# Patient Record
Sex: MALE | Race: WHITE | NOT HISPANIC OR LATINO | URBAN - METROPOLITAN AREA
[De-identification: names, ages, dates, MRNs, and addresses within clinical notes are randomized per-mention and may not be internally consistent; named-entity substitution may affect disease eponyms.]

---

## 2017-01-05 ENCOUNTER — EMERGENCY (EMERGENCY)
Facility: HOSPITAL | Age: 62
LOS: 1 days | Discharge: HOME | End: 2017-01-05
Admitting: INTERNAL MEDICINE
Payer: COMMERCIAL

## 2017-01-05 DIAGNOSIS — Z90.49 ACQUIRED ABSENCE OF OTHER SPECIFIED PARTS OF DIGESTIVE TRACT: Chronic | ICD-10-CM

## 2017-01-05 DIAGNOSIS — M79.641 PAIN IN RIGHT HAND: ICD-10-CM

## 2017-01-05 DIAGNOSIS — Z87.39 PERSONAL HISTORY OF OTHER DISEASES OF THE MUSCULOSKELETAL SYSTEM AND CONNECTIVE TISSUE: Chronic | ICD-10-CM

## 2017-01-05 PROCEDURE — 85027 COMPLETE CBC AUTOMATED: CPT

## 2017-01-05 PROCEDURE — 80048 BASIC METABOLIC PNL TOTAL CA: CPT

## 2017-01-05 PROCEDURE — 99284 EMERGENCY DEPT VISIT MOD MDM: CPT | Mod: 25

## 2017-01-05 PROCEDURE — 99284 EMERGENCY DEPT VISIT MOD MDM: CPT

## 2017-01-05 PROCEDURE — 85379 FIBRIN DEGRADATION QUANT: CPT

## 2017-01-05 PROCEDURE — 96374 THER/PROPH/DIAG INJ IV PUSH: CPT

## 2017-01-25 ENCOUNTER — APPOINTMENT (OUTPATIENT)
Dept: ORTHOPEDIC SURGERY | Facility: CLINIC | Age: 62
End: 2017-01-25

## 2017-01-25 VITALS
BODY MASS INDEX: 28 KG/M2 | SYSTOLIC BLOOD PRESSURE: 165 MMHG | HEART RATE: 84 BPM | WEIGHT: 200 LBS | HEIGHT: 71 IN | DIASTOLIC BLOOD PRESSURE: 93 MMHG

## 2017-01-25 DIAGNOSIS — Z78.9 OTHER SPECIFIED HEALTH STATUS: ICD-10-CM

## 2017-01-25 DIAGNOSIS — G56.21 LESION OF ULNAR NERVE, RIGHT UPPER LIMB: ICD-10-CM

## 2017-01-25 DIAGNOSIS — Z86.79 PERSONAL HISTORY OF OTHER DISEASES OF THE CIRCULATORY SYSTEM: ICD-10-CM

## 2017-01-25 RX ORDER — AMLODIPINE BESYLATE AND VALSARTAN 10; 320 MG/1; MG/1
TABLET, FILM COATED ORAL
Refills: 0 | Status: ACTIVE | COMMUNITY

## 2017-02-01 ENCOUNTER — APPOINTMENT (OUTPATIENT)
Dept: ORTHOPEDIC SURGERY | Facility: CLINIC | Age: 62
End: 2017-02-01

## 2017-02-01 VITALS
HEIGHT: 71 IN | WEIGHT: 201 LBS | DIASTOLIC BLOOD PRESSURE: 65 MMHG | SYSTOLIC BLOOD PRESSURE: 105 MMHG | BODY MASS INDEX: 28.14 KG/M2 | HEART RATE: 51 BPM

## 2017-02-07 ENCOUNTER — CHART COPY (OUTPATIENT)
Age: 62
End: 2017-02-07

## 2017-03-22 ENCOUNTER — APPOINTMENT (OUTPATIENT)
Dept: ORTHOPEDIC SURGERY | Facility: CLINIC | Age: 62
End: 2017-03-22

## 2017-04-09 ENCOUNTER — OUTPATIENT (OUTPATIENT)
Dept: OUTPATIENT SERVICES | Facility: HOSPITAL | Age: 62
LOS: 1 days | End: 2017-04-09
Payer: COMMERCIAL

## 2017-04-09 ENCOUNTER — APPOINTMENT (OUTPATIENT)
Dept: MRI IMAGING | Facility: CLINIC | Age: 62
End: 2017-04-09
Payer: SELF-PAY

## 2017-04-09 DIAGNOSIS — M70.31 OTHER BURSITIS OF ELBOW, RIGHT ELBOW: ICD-10-CM

## 2017-04-09 DIAGNOSIS — Z90.49 ACQUIRED ABSENCE OF OTHER SPECIFIED PARTS OF DIGESTIVE TRACT: Chronic | ICD-10-CM

## 2017-04-09 DIAGNOSIS — Z87.39 PERSONAL HISTORY OF OTHER DISEASES OF THE MUSCULOSKELETAL SYSTEM AND CONNECTIVE TISSUE: Chronic | ICD-10-CM

## 2017-04-09 PROCEDURE — 73221 MRI JOINT UPR EXTREM W/O DYE: CPT | Mod: 26,RT

## 2017-04-09 PROCEDURE — 73221 MRI JOINT UPR EXTREM W/O DYE: CPT

## 2017-04-19 ENCOUNTER — CHART COPY (OUTPATIENT)
Age: 62
End: 2017-04-19

## 2017-05-12 ENCOUNTER — APPOINTMENT (OUTPATIENT)
Dept: ORTHOPEDIC SURGERY | Facility: CLINIC | Age: 62
End: 2017-05-12

## 2017-05-12 VITALS — SYSTOLIC BLOOD PRESSURE: 152 MMHG | DIASTOLIC BLOOD PRESSURE: 71 MMHG | HEART RATE: 56 BPM

## 2017-05-12 VITALS — HEIGHT: 72 IN | BODY MASS INDEX: 27.09 KG/M2 | WEIGHT: 200 LBS

## 2017-05-31 ENCOUNTER — APPOINTMENT (OUTPATIENT)
Dept: ORTHOPEDIC SURGERY | Facility: CLINIC | Age: 62
End: 2017-05-31

## 2017-05-31 VITALS — BODY MASS INDEX: 27.09 KG/M2 | HEIGHT: 72 IN | WEIGHT: 200 LBS

## 2017-05-31 DIAGNOSIS — M70.31 OTHER BURSITIS OF ELBOW, RIGHT ELBOW: ICD-10-CM

## 2017-06-19 ENCOUNTER — RX RENEWAL (OUTPATIENT)
Age: 62
End: 2017-06-19

## 2017-07-18 ENCOUNTER — OUTPATIENT (OUTPATIENT)
Dept: OUTPATIENT SERVICES | Facility: HOSPITAL | Age: 62
LOS: 1 days | End: 2017-07-18
Payer: COMMERCIAL

## 2017-07-18 ENCOUNTER — APPOINTMENT (OUTPATIENT)
Dept: MRI IMAGING | Facility: CLINIC | Age: 62
End: 2017-07-18

## 2017-07-18 DIAGNOSIS — Z87.39 PERSONAL HISTORY OF OTHER DISEASES OF THE MUSCULOSKELETAL SYSTEM AND CONNECTIVE TISSUE: Chronic | ICD-10-CM

## 2017-07-18 DIAGNOSIS — Z00.8 ENCOUNTER FOR OTHER GENERAL EXAMINATION: ICD-10-CM

## 2017-07-18 DIAGNOSIS — Z90.49 ACQUIRED ABSENCE OF OTHER SPECIFIED PARTS OF DIGESTIVE TRACT: Chronic | ICD-10-CM

## 2017-07-18 PROCEDURE — 72141 MRI NECK SPINE W/O DYE: CPT

## 2017-11-24 ENCOUNTER — APPOINTMENT (OUTPATIENT)
Dept: MRI IMAGING | Facility: CLINIC | Age: 62
End: 2017-11-24

## 2017-11-27 ENCOUNTER — APPOINTMENT (OUTPATIENT)
Dept: MRI IMAGING | Facility: CLINIC | Age: 62
End: 2017-11-27
Payer: COMMERCIAL

## 2017-11-27 ENCOUNTER — OUTPATIENT (OUTPATIENT)
Dept: OUTPATIENT SERVICES | Facility: HOSPITAL | Age: 62
LOS: 1 days | End: 2017-11-27
Payer: COMMERCIAL

## 2017-11-27 DIAGNOSIS — M66.321 SPONTANEOUS RUPTURE OF FLEXOR TENDONS, RIGHT UPPER ARM: ICD-10-CM

## 2017-11-27 DIAGNOSIS — Z90.49 ACQUIRED ABSENCE OF OTHER SPECIFIED PARTS OF DIGESTIVE TRACT: Chronic | ICD-10-CM

## 2017-11-27 DIAGNOSIS — Z87.39 PERSONAL HISTORY OF OTHER DISEASES OF THE MUSCULOSKELETAL SYSTEM AND CONNECTIVE TISSUE: Chronic | ICD-10-CM

## 2017-11-27 PROCEDURE — 73221 MRI JOINT UPR EXTREM W/O DYE: CPT

## 2017-11-27 PROCEDURE — 73221 MRI JOINT UPR EXTREM W/O DYE: CPT | Mod: 26,RT

## 2017-12-28 ENCOUNTER — OUTPATIENT (OUTPATIENT)
Dept: OUTPATIENT SERVICES | Facility: HOSPITAL | Age: 62
LOS: 1 days | End: 2017-12-28
Payer: COMMERCIAL

## 2017-12-28 ENCOUNTER — APPOINTMENT (OUTPATIENT)
Dept: MRI IMAGING | Facility: CLINIC | Age: 62
End: 2017-12-28

## 2017-12-28 DIAGNOSIS — Z00.8 ENCOUNTER FOR OTHER GENERAL EXAMINATION: ICD-10-CM

## 2017-12-28 DIAGNOSIS — Z87.39 PERSONAL HISTORY OF OTHER DISEASES OF THE MUSCULOSKELETAL SYSTEM AND CONNECTIVE TISSUE: Chronic | ICD-10-CM

## 2017-12-28 DIAGNOSIS — Z90.49 ACQUIRED ABSENCE OF OTHER SPECIFIED PARTS OF DIGESTIVE TRACT: Chronic | ICD-10-CM

## 2017-12-28 PROCEDURE — 73721 MRI JNT OF LWR EXTRE W/O DYE: CPT

## 2017-12-28 PROCEDURE — 73721 MRI JNT OF LWR EXTRE W/O DYE: CPT | Mod: 26,RT

## 2018-05-02 ENCOUNTER — OUTPATIENT (OUTPATIENT)
Dept: OUTPATIENT SERVICES | Facility: HOSPITAL | Age: 63
LOS: 1 days | End: 2018-05-02
Payer: COMMERCIAL

## 2018-05-02 VITALS
RESPIRATION RATE: 16 BRPM | TEMPERATURE: 98 F | HEART RATE: 55 BPM | WEIGHT: 162.04 LBS | HEIGHT: 71 IN | DIASTOLIC BLOOD PRESSURE: 80 MMHG | SYSTOLIC BLOOD PRESSURE: 130 MMHG

## 2018-05-02 DIAGNOSIS — Z87.39 PERSONAL HISTORY OF OTHER DISEASES OF THE MUSCULOSKELETAL SYSTEM AND CONNECTIVE TISSUE: Chronic | ICD-10-CM

## 2018-05-02 DIAGNOSIS — G56.00 CARPAL TUNNEL SYNDROME, UNSPECIFIED UPPER LIMB: ICD-10-CM

## 2018-05-02 DIAGNOSIS — G56.01 CARPAL TUNNEL SYNDROME, RIGHT UPPER LIMB: ICD-10-CM

## 2018-05-02 DIAGNOSIS — Z90.49 ACQUIRED ABSENCE OF OTHER SPECIFIED PARTS OF DIGESTIVE TRACT: Chronic | ICD-10-CM

## 2018-05-02 DIAGNOSIS — Z98.890 OTHER SPECIFIED POSTPROCEDURAL STATES: Chronic | ICD-10-CM

## 2018-05-02 DIAGNOSIS — I10 ESSENTIAL (PRIMARY) HYPERTENSION: ICD-10-CM

## 2018-05-02 LAB
BUN SERPL-MCNC: 15 MG/DL — SIGNIFICANT CHANGE UP (ref 7–23)
CALCIUM SERPL-MCNC: 9.3 MG/DL — SIGNIFICANT CHANGE UP (ref 8.4–10.5)
CHLORIDE SERPL-SCNC: 104 MMOL/L — SIGNIFICANT CHANGE UP (ref 98–107)
CO2 SERPL-SCNC: 27 MMOL/L — SIGNIFICANT CHANGE UP (ref 22–31)
CREAT SERPL-MCNC: 1.16 MG/DL — SIGNIFICANT CHANGE UP (ref 0.5–1.3)
GLUCOSE SERPL-MCNC: 81 MG/DL — SIGNIFICANT CHANGE UP (ref 70–99)
HCT VFR BLD CALC: 38.9 % — LOW (ref 39–50)
HGB BLD-MCNC: 12.3 G/DL — LOW (ref 13–17)
MCHC RBC-ENTMCNC: 28.9 PG — SIGNIFICANT CHANGE UP (ref 27–34)
MCHC RBC-ENTMCNC: 31.6 % — LOW (ref 32–36)
MCV RBC AUTO: 91.3 FL — SIGNIFICANT CHANGE UP (ref 80–100)
NRBC # FLD: 0 — SIGNIFICANT CHANGE UP
PLATELET # BLD AUTO: 260 K/UL — SIGNIFICANT CHANGE UP (ref 150–400)
PMV BLD: 10.7 FL — SIGNIFICANT CHANGE UP (ref 7–13)
POTASSIUM SERPL-MCNC: 4.5 MMOL/L — SIGNIFICANT CHANGE UP (ref 3.5–5.3)
POTASSIUM SERPL-SCNC: 4.5 MMOL/L — SIGNIFICANT CHANGE UP (ref 3.5–5.3)
RBC # BLD: 4.26 M/UL — SIGNIFICANT CHANGE UP (ref 4.2–5.8)
RBC # FLD: 12.7 % — SIGNIFICANT CHANGE UP (ref 10.3–14.5)
SODIUM SERPL-SCNC: 141 MMOL/L — SIGNIFICANT CHANGE UP (ref 135–145)
WBC # BLD: 4.24 K/UL — SIGNIFICANT CHANGE UP (ref 3.8–10.5)
WBC # FLD AUTO: 4.24 K/UL — SIGNIFICANT CHANGE UP (ref 3.8–10.5)

## 2018-05-02 PROCEDURE — 93010 ELECTROCARDIOGRAM REPORT: CPT

## 2018-05-02 NOTE — H&P PST ADULT - PSH
Ganglion  EXCISION - 1971  H/O arthroscopy of right knee  4/18  H/O: rotator cuff tear  6/2015  S/P Appendectomy  1986  S/P cholecystectomy  2013  S/P Discectomy  LUMBAR - 2009

## 2018-05-02 NOTE — H&P PST ADULT - PROBLEM SELECTOR PLAN 1
Pt given pre-op instructions and chlorhexidine and to take own GI protection   Pt to see PCP for MC today - forms given   OR Booking notified of JETT precautions , MS  Call placed on  mg by Ortho for recent Arthroscopy and call to Surgeon office and LM of same for confirmation.

## 2018-05-02 NOTE — H&P PST ADULT - NEUROLOGICAL DETAILS
sensation intact/strength decreased/alert and oriented x 3/responds to pain/responds to verbal commands

## 2018-05-02 NOTE — H&P PST ADULT - MALLAMPATI CLASS
Class IV (difficult) - the soft palate is not visible at all/uvula not visualized with phonation Class III - visualization of the soft palate and the base of the uvula/with phonation

## 2018-05-02 NOTE — H&P PST ADULT - NSANTHOSAYNRD_GEN_A_CORE
No. JETT screening performed.  STOP BANG Legend: 0-2 = LOW Risk; 3-4 = INTERMEDIATE Risk; 5-8 = HIGH Risk

## 2018-05-02 NOTE — H&P PST ADULT - HISTORY OF PRESENT ILLNESS
Pt is a 61 yr old male had  right shoulder arthroscopy rotator cuff repair 2015. with improvement in pain.  Per pet,  pain returned soon after while in PT and he was dx with another tear to (R) rotator cuff.  Scheduled for Right Shoulder Arthroscopy Revision Rotator Cuff Repair possible Biceps Tenodesis  9/6/16. Pt is a 62 yr old male scheduled for Right Endoscopic Carpal Tunnel Release with dr Martinez 5/7/18. Pt c/o of pain and numbness right hand and fingers for past 5 years - now to have repair. Pt hx of MS diagnosed 18 years ago - not on meds at this time and c/o of mild left sided weakness but denies need for support with ambulation. Pt hx of right shoulder rotator cuff surgery 2016, and right knee Arthroscopy 4/19/18.

## 2018-05-02 NOTE — H&P PST ADULT - NEGATIVE ENMT SYMPTOMS
no vertigo/no sinus symptoms/no tinnitus/no nasal congestion/no throat pain/no dysphagia/no hearing difficulty/no ear pain

## 2018-05-02 NOTE — H&P PST ADULT - PMH
Carpal tunnel syndrome  right  GERD (Gastroesophageal Reflux Disease)    Hypertension    Meniscus degeneration  Right knee - 4/13/18  Multiple Sclerosis    Rotator cuff disorder, right

## 2018-05-02 NOTE — H&P PST ADULT - SENSORY
numbness and tingling in right hand fingers r/t carpal tunnel  slight left sided weakness - pt denies need for support with ambulation

## 2018-05-02 NOTE — H&P PST ADULT - NEUROLOGICAL COMMENTS
Hx of MS diagnosed 18 yrs ago - left sided weakness with leg stronger than arm - pt denies need for support with ambulation - seen by Neuro and has been part of recent trial - no meds at present Dx of MS 18 yrs ago - pt not on med at this time - recent participant in study trial Hx of MS diagnosed 18 yrs ago - mild left sided weakness with leg stronger than arm - pt denies need for support with ambulation - seen by Neuro and has been part of recent trial - no meds at present

## 2018-05-02 NOTE — H&P PST ADULT - MUSCULOSKELETAL COMMENTS
Pt c/o of right wrist numbness and tingling for past 5 years that he now has decided to have repaired. - pt had right knee arthroscopy 4/19/18 and hx of right shoulder rotator cuff repair 9/16 with chronic pain 3-5/10 Pt c/o of right shoulder pain with certain movements Pt c/o of right wrist numbness and tingling for past 5 years that he now has decided to have repaired. - pt had right knee arthroscopy 4/19/18 and on  mg po OD as per surgeon instructions postop -  and hx of right shoulder rotator cuff repair 9/16 with chronic pain 3-5/10

## 2018-05-06 ENCOUNTER — TRANSCRIPTION ENCOUNTER (OUTPATIENT)
Age: 63
End: 2018-05-06

## 2018-05-07 ENCOUNTER — OUTPATIENT (OUTPATIENT)
Dept: OUTPATIENT SERVICES | Facility: HOSPITAL | Age: 63
LOS: 1 days | Discharge: HOME | End: 2018-05-07

## 2018-05-07 VITALS
OXYGEN SATURATION: 100 % | HEIGHT: 71 IN | TEMPERATURE: 98 F | HEART RATE: 58 BPM | DIASTOLIC BLOOD PRESSURE: 77 MMHG | SYSTOLIC BLOOD PRESSURE: 138 MMHG | RESPIRATION RATE: 16 BRPM | WEIGHT: 207.23 LBS

## 2018-05-07 VITALS
DIASTOLIC BLOOD PRESSURE: 71 MMHG | TEMPERATURE: 98 F | RESPIRATION RATE: 16 BRPM | OXYGEN SATURATION: 100 % | SYSTOLIC BLOOD PRESSURE: 118 MMHG | HEART RATE: 62 BPM

## 2018-05-07 DIAGNOSIS — Z90.49 ACQUIRED ABSENCE OF OTHER SPECIFIED PARTS OF DIGESTIVE TRACT: Chronic | ICD-10-CM

## 2018-05-07 DIAGNOSIS — Z98.890 OTHER SPECIFIED POSTPROCEDURAL STATES: Chronic | ICD-10-CM

## 2018-05-07 DIAGNOSIS — Z87.39 PERSONAL HISTORY OF OTHER DISEASES OF THE MUSCULOSKELETAL SYSTEM AND CONNECTIVE TISSUE: Chronic | ICD-10-CM

## 2018-05-07 DIAGNOSIS — G56.01 CARPAL TUNNEL SYNDROME, RIGHT UPPER LIMB: ICD-10-CM

## 2018-05-07 RX ORDER — ONDANSETRON 8 MG/1
1 TABLET, FILM COATED ORAL
Qty: 6 | Refills: 0 | OUTPATIENT
Start: 2018-05-07

## 2018-05-07 NOTE — ASU PREOP CHECKLIST - SITE MARKED BY SURGEON
Writer gave RN to RN report to [place]. Plan to discharge at [id number] via [employer] transportation. RN to call [phone] with any questions.   right yes/right

## 2018-07-17 PROBLEM — M23.309 OTHER MENISCUS DERANGEMENTS, UNSPECIFIED MENISCUS, UNSPECIFIED KNEE: Chronic | Status: ACTIVE | Noted: 2018-05-02

## 2018-07-18 PROBLEM — G56.00 CARPAL TUNNEL SYNDROME, UNSPECIFIED UPPER LIMB: Chronic | Status: ACTIVE | Noted: 2018-05-02

## 2018-08-15 ENCOUNTER — APPOINTMENT (OUTPATIENT)
Dept: SPINE | Facility: CLINIC | Age: 63
End: 2018-08-15
Payer: COMMERCIAL

## 2018-08-15 VITALS — DIASTOLIC BLOOD PRESSURE: 95 MMHG | HEART RATE: 80 BPM | SYSTOLIC BLOOD PRESSURE: 144 MMHG

## 2018-08-15 DIAGNOSIS — Z86.69 PERSONAL HISTORY OF OTHER DISEASES OF THE NERVOUS SYSTEM AND SENSE ORGANS: ICD-10-CM

## 2018-08-15 PROCEDURE — 99244 OFF/OP CNSLTJ NEW/EST MOD 40: CPT

## 2018-08-15 RX ORDER — FUROSEMIDE 80 MG/1
TABLET ORAL
Refills: 0 | Status: COMPLETED | COMMUNITY
End: 2018-08-15

## 2018-08-15 RX ORDER — BUMETANIDE 0.5 MG/1
TABLET ORAL
Refills: 0 | Status: COMPLETED | COMMUNITY
End: 2018-08-15

## 2018-08-15 RX ORDER — PREGABALIN 300 MG/1
CAPSULE ORAL
Refills: 0 | Status: COMPLETED | COMMUNITY
End: 2018-08-15

## 2018-08-15 RX ORDER — OXYCODONE HYDROCHLORIDE AND ACETAMINOPHEN 10; 325 MG/1; MG/1
TABLET ORAL
Refills: 0 | Status: COMPLETED | COMMUNITY
End: 2018-08-15

## 2019-03-19 ENCOUNTER — APPOINTMENT (OUTPATIENT)
Dept: ORTHOPEDIC SURGERY | Facility: CLINIC | Age: 64
End: 2019-03-19
Payer: COMMERCIAL

## 2019-03-19 VITALS
HEIGHT: 72 IN | HEART RATE: 57 BPM | WEIGHT: 200 LBS | SYSTOLIC BLOOD PRESSURE: 150 MMHG | BODY MASS INDEX: 27.09 KG/M2 | DIASTOLIC BLOOD PRESSURE: 84 MMHG

## 2019-03-19 PROCEDURE — 99214 OFFICE O/P EST MOD 30 MIN: CPT

## 2019-05-01 ENCOUNTER — APPOINTMENT (OUTPATIENT)
Dept: ORTHOPEDIC SURGERY | Facility: CLINIC | Age: 64
End: 2019-05-01

## 2019-08-12 ENCOUNTER — APPOINTMENT (OUTPATIENT)
Dept: ORTHOPEDIC SURGERY | Facility: CLINIC | Age: 64
End: 2019-08-12
Payer: COMMERCIAL

## 2019-08-12 PROCEDURE — 99213 OFFICE O/P EST LOW 20 MIN: CPT

## 2019-10-25 ENCOUNTER — APPOINTMENT (OUTPATIENT)
Dept: ORTHOPEDIC SURGERY | Facility: CLINIC | Age: 64
End: 2019-10-25
Payer: COMMERCIAL

## 2019-10-25 PROCEDURE — 99213 OFFICE O/P EST LOW 20 MIN: CPT

## 2019-10-25 NOTE — HISTORY OF PRESENT ILLNESS
[Worsening] : worsening [de-identified] : 61 year old RHD male, works in a computer based office work, plays golf but unable to for years due to right shoulder pain. He was seen in March 2019 and was referred to seek additional opinions from other surgeons. He has spoken with several surgeons and presents to discuss what he has learned from those visits. He is interested in surgical management of his shoulder condition but would like to discuss the treatment options again today.   \par \par He has had 2 rotator cuff surgeries by Dr. Arpan Rogers in 2015 and then again in 2016. He had a subsequent MRI which demonstrated a chronic large retracted tear of the supraspinatus.\par He works in accounting department. He has a history of multiple sclerosis diagnosed 15 years ago. \par

## 2019-10-25 NOTE — DISCUSSION/SUMMARY
[de-identified] : Rotator cuff deficiency/pain right shoulder. Patient is bothered by pain/weakness of his right shoulder and arm. Post 2 arthroscopic shoulder surgeries/repairs with biceps tenodesis/decompression/distal clavicle resection.  Nonsurgical management is palliative management with activity modification acceptance of limitations.  Possible further pain relief surgical procedures for his condition are superior capsule reconstruction versus reversed total shoulder replacement. I explained to the patient that both are essentially salvage procedures for pain relief.  Both have their limitations, potential complications and will not return him to full "normal strength."  \par \par He has consulted with several orthopedic surgeons regarding his shoulder problem. Different treatments have been suggested including the use of a regenerative patch as well as possible subacromial balloon. I spoke to him regarding the investigational nature of these procedures with a level evidence to support their use at this time. He would like to continue with nonsurgical care for now and we will try some physical therapy.

## 2019-10-25 NOTE — PHYSICAL EXAM
[Rad] : radial 2+ and symmetric bilaterally [Normal] : Alert and in no acute distress [de-identified] : Right shoulder: Healed arthroscopy scars. No warmth. No swelling. Positive  magan deformity. He can lift the right shoulder and arm overhead 130°. Abduction 90°. Internal rotation. Weakness with resisted forward elevation and external rotation. Intact belly press. Negative hornblowers.  Left shoulder: Skin intact. No warmth. No swelling. No tenderness. Left shoulder active motion: 145° forward elevation.

## 2021-06-09 NOTE — ASU PREOP CHECKLIST - AS TEMP SITE
IDR Team; MD, Nursing, Care Manager, Physical therapy, Nursing Supervisor, Pharmacy and Dietician, met to review patient's plan of care. Discussed goals, interventions, barriers and progress. Team will continue to monitor progress and report any concerns to the physician and care management as indicated. Transition of Care Plan: Patient's bp has been a bit low. Orthostatic bps were ordered. PT/OT believe patient is at baseline. tympanic

## 2021-07-06 ENCOUNTER — APPOINTMENT (OUTPATIENT)
Dept: MRI IMAGING | Facility: CLINIC | Age: 66
End: 2021-07-06
Payer: MEDICARE

## 2021-07-06 ENCOUNTER — OUTPATIENT (OUTPATIENT)
Dept: OUTPATIENT SERVICES | Facility: HOSPITAL | Age: 66
LOS: 1 days | End: 2021-07-06
Payer: MEDICARE

## 2021-07-06 DIAGNOSIS — M67.911 UNSPECIFIED DISORDER OF SYNOVIUM AND TENDON, RIGHT SHOULDER: ICD-10-CM

## 2021-07-06 DIAGNOSIS — Z87.39 PERSONAL HISTORY OF OTHER DISEASES OF THE MUSCULOSKELETAL SYSTEM AND CONNECTIVE TISSUE: Chronic | ICD-10-CM

## 2021-07-06 DIAGNOSIS — Z98.890 OTHER SPECIFIED POSTPROCEDURAL STATES: Chronic | ICD-10-CM

## 2021-07-06 DIAGNOSIS — Z90.49 ACQUIRED ABSENCE OF OTHER SPECIFIED PARTS OF DIGESTIVE TRACT: Chronic | ICD-10-CM

## 2021-07-06 PROCEDURE — 73221 MRI JOINT UPR EXTREM W/O DYE: CPT | Mod: 26,RT,MH

## 2021-07-06 PROCEDURE — 73221 MRI JOINT UPR EXTREM W/O DYE: CPT

## 2021-07-08 ENCOUNTER — APPOINTMENT (OUTPATIENT)
Dept: ORTHOPEDIC SURGERY | Facility: CLINIC | Age: 66
End: 2021-07-08
Payer: MEDICARE

## 2021-07-08 VITALS — DIASTOLIC BLOOD PRESSURE: 80 MMHG | SYSTOLIC BLOOD PRESSURE: 131 MMHG | HEART RATE: 49 BPM

## 2021-07-08 VITALS — HEIGHT: 71 IN | BODY MASS INDEX: 28.7 KG/M2 | WEIGHT: 205 LBS

## 2021-07-08 DIAGNOSIS — M75.101 UNSPECIFIED ROTATOR CUFF TEAR OR RUPTURE OF RIGHT SHOULDER, NOT SPECIFIED AS TRAUMATIC: ICD-10-CM

## 2021-07-08 DIAGNOSIS — S46.119S STRAIN OF MUSCLE, FASCIA AND TENDON OF LONG HEAD OF BICEPS, UNSPECIFIED ARM, SEQUELA: ICD-10-CM

## 2021-07-08 PROCEDURE — 99213 OFFICE O/P EST LOW 20 MIN: CPT

## 2023-01-13 ENCOUNTER — APPOINTMENT (OUTPATIENT)
Dept: ORTHOPEDIC SURGERY | Facility: CLINIC | Age: 68
End: 2023-01-13
Payer: MEDICARE

## 2023-01-13 VITALS
OXYGEN SATURATION: 98 % | HEIGHT: 71 IN | TEMPERATURE: 97.9 F | BODY MASS INDEX: 28 KG/M2 | DIASTOLIC BLOOD PRESSURE: 82 MMHG | WEIGHT: 200 LBS | HEART RATE: 92 BPM | SYSTOLIC BLOOD PRESSURE: 149 MMHG

## 2023-01-13 DIAGNOSIS — M25.511 PAIN IN RIGHT SHOULDER: ICD-10-CM

## 2023-01-13 DIAGNOSIS — M54.2 CERVICALGIA: ICD-10-CM

## 2023-01-13 DIAGNOSIS — M67.911 UNSPECIFIED DISORDER OF SYNOVIUM AND TENDON, RIGHT SHOULDER: ICD-10-CM

## 2023-01-13 PROCEDURE — 72040 X-RAY EXAM NECK SPINE 2-3 VW: CPT

## 2023-01-13 PROCEDURE — 99213 OFFICE O/P EST LOW 20 MIN: CPT

## 2023-01-13 PROCEDURE — 73030 X-RAY EXAM OF SHOULDER: CPT | Mod: RT

## 2023-06-13 ENCOUNTER — APPOINTMENT (OUTPATIENT)
Dept: ORTHOPEDIC SURGERY | Facility: CLINIC | Age: 68
End: 2023-06-13

## 2023-08-28 ENCOUNTER — APPOINTMENT (OUTPATIENT)
Dept: ORTHOPEDIC SURGERY | Facility: CLINIC | Age: 68
End: 2023-08-28
Payer: MEDICARE

## 2023-08-28 VITALS — BODY MASS INDEX: 28 KG/M2 | WEIGHT: 200 LBS | HEIGHT: 71 IN

## 2023-08-28 DIAGNOSIS — S63.502A UNSPECIFIED SPRAIN OF LEFT WRIST, INITIAL ENCOUNTER: ICD-10-CM

## 2023-08-28 PROCEDURE — 73110 X-RAY EXAM OF WRIST: CPT | Mod: LT

## 2023-08-28 PROCEDURE — L3908: CPT | Mod: LT

## 2023-08-28 PROCEDURE — 99203 OFFICE O/P NEW LOW 30 MIN: CPT | Mod: 25

## 2023-08-28 NOTE — DISCUSSION/SUMMARY
[de-identified] : "Written by Vaishali Perera, acting as Scribe for Sulaiman Mcneal MD."  Dr. Mcneal -  The documentation recorded by the scribe accurately reflects the service I personally performed and the decisions made by me.

## 2023-08-28 NOTE — PHYSICAL EXAM
[Anatomic Snuff Box] : anatomic snuff box [NL (75)] : Dorsiflexion 75 degrees [NL (90)] : supination 90 degrees [Wrist Dorsiflexion] : wrist dorsiflexion [5___] : finger abductor 5[unfilled]/5 [Normal Mood and Affect] : normal mood and affect [Able to Communicate] : able to communicate [Well Developed] : well developed [Well Nourished] : well nourished [] : no ecchymosis [Left] : left wrist [There are no fractures, subluxations or dislocations. No significant abnormalities are seen] : There are no fractures, subluxations or dislocations. No significant abnormalities are seen [de-identified] : No TFCC [TWNoteComboBox4] : volarflexion 80 degrees

## 2023-08-28 NOTE — PLAN
[TextEntry] : The patient was advised of the diagnosis. The natural history of the pathology was explained in full to the patient in layman's terms. All questions were answered. The risks and benefits of surgical and non-surgical treatment alternatives were explained in full to the patient.   Fitted with a wrist cock-up splint today. Apply ice to the area.   If no improvement, consider MRI.

## 2023-08-28 NOTE — HISTORY OF PRESENT ILLNESS
[Sudden] : sudden [7] : 7 [0] : 0 [Dull/Aching] : dull/aching [Localized] : localized [Retired] : Work status: retired [de-identified] : 8/28/23 Initial visit for this 68-year-old male RHD fell into a wall and injured lt wrist x 4 months ago. Went to  at the time where x-rays were negative for a fx. Living with it but has been getting gradual worse x last 2 months duration. Hurts gripping and grasping. using a wrist brace which helps. taking no nsaids.  PMH:  No previous left wrist issues.  Does have MS, affected left side more than the right. [] : Post Surgical Visit: no [FreeTextEntry1] : left wrist [FreeTextEntry3] : 4/2023 [FreeTextEntry5] : 67 y/o M here for pain in left wrist. Pt fell into wall and caught himself using left hand 4 months ago. Pt states XR at [facility] (5/2/23) showed no fractures, but he did not bring CD of XR with him. Pain gradually worsening since injury. Pt uses wrist brace which improves pain. [FreeTextEntry9] : brace [de-identified] : 5/2/2023 [de-identified] : [facility] [de-identified] : Xray [de-identified] : brace

## 2023-10-14 ENCOUNTER — APPOINTMENT (OUTPATIENT)
Dept: MRI IMAGING | Facility: CLINIC | Age: 68
End: 2023-10-14

## 2023-10-20 NOTE — ASU PREOP CHECKLIST - BOWEL PREP
Target blood sugars are 100-130 when waking, and under 180 two hours after a meal and before bedtime.     LANTUS TO 19 UNITS TO STOP/PREVENT EARLY MORNING LOWS YOU MAY GO TO 20 UNITS MAX DOSE FOR LONG ACTING    FOR DINNER ONLY TRY THIS SCALE  Bloods sugar .....UNITS   ...................0   121-140.................1   141-160................2  161-180................3  181-200................4   201-220................5   221-240................6   241-260................7   261-280................8   281-300................9   n/a

## 2024-04-01 NOTE — ASU PREOPERATIVE ASSESSMENT, ADULT ([facility] ONLY) - ESCORT HOME
Chart and labs reviewed for length of stay. No nutrition intervention indicated at this time. RD available via consult. Will continue to follow per protocol.      yes wife

## 2024-06-03 ENCOUNTER — APPOINTMENT (OUTPATIENT)
Dept: ORTHOPEDIC SURGERY | Facility: CLINIC | Age: 69
End: 2024-06-03
Payer: MEDICARE

## 2024-06-03 VITALS — WEIGHT: 200 LBS | BODY MASS INDEX: 28 KG/M2 | HEIGHT: 71 IN

## 2024-06-03 DIAGNOSIS — M75.111 INCOMPLETE ROTATOR CUFF TEAR OR RUPTURE OF RIGHT SHOULDER, NOT SPECIFIED AS TRAUMATIC: ICD-10-CM

## 2024-06-03 DIAGNOSIS — Z98.890 OTHER SPECIFIED POSTPROCEDURAL STATES: ICD-10-CM

## 2024-06-03 PROCEDURE — 99214 OFFICE O/P EST MOD 30 MIN: CPT | Mod: 25

## 2024-06-03 PROCEDURE — 73030 X-RAY EXAM OF SHOULDER: CPT | Mod: RT

## 2024-06-03 PROCEDURE — 20611 DRAIN/INJ JOINT/BURSA W/US: CPT | Mod: RT

## 2024-06-03 PROCEDURE — 73010 X-RAY EXAM OF SHOULDER BLADE: CPT | Mod: RT

## 2024-06-03 NOTE — DATA REVIEWED
[FreeTextEntry1] : MRI R SHOULDER 2021 [facility] (report): There is cuff thinning and fraying with HH cartilage loss.

## 2024-06-03 NOTE — IMAGING
[Right] : right shoulder [FreeTextEntry1] : The anterior metal anchor is unchanged.  There is some GH narrowing, though the same as the 2019 films.  There is AC spurring. [FreeTextEntry5] : There is a Type I-II acromion with a small lateral spur. The AHI seems ok.

## 2024-06-03 NOTE — REASON FOR VISIT
[FreeTextEntry2] : This is a 69 year old RHD retired M with right shoulder pain that has worsened since early 2024.  There was no recent injury.  He has had two prior surgeries.  The first was in 2015 and included a repair of a 2cm cuff tear (4 anchors), and a revision in 2016 by Dr. RAMAKRISHNA Rogers with a soft tissue biceps tenodesis, FiberTape and 2 SwivelLock anchors, and a DCR.  He did OK.  There was pain on and off and he last saw Dr. Pedroza in 2019.  He had had injections with Dr. Marino, and most recently saw Dr. Marcelino.  A shoulder replacement was 50/50.  He walks with a cane in his right hand for MS.

## 2024-06-03 NOTE — PHYSICAL EXAM
[Right] : right shoulder [Sitting] : sitting [Moderate] : moderate [Mild] : mild [4 ___] : forward flexion 4[unfilled]/5 [4___] : external rotation 4[unfilled]/5 [] : no sensory deficits [FreeTextEntry9] : L SHOULDER: 150/50 [TWNoteComboBox4] : passive forward flexion 150 degrees [de-identified] : external rotation 45 degrees

## 2024-06-03 NOTE — ASSESSMENT
[FreeTextEntry1] : We reviewed the findings and the history. Questions were answered and concerns addressed. The options were outlined. An injection is planned today.  PT is planned.  If symptoms persist, an MRI is planned.  Information about an in-space balloon was briefly discussed.   Patient was seen by Dr. Shar Pedroaz. Patient was seen by Malena JERNIGAN under the supervision of Dr. Shar Pedroza. Progress note was completed by Malena JERNIGAN. Entered by Nadiya Gomez acting as scribe.  Procedure Name: Large Joint Injection / Aspiration: Depomedrol, Lidocaine and Guidance Ultrasound  Large Joint Injection was performed because of pain and inflammation. Depomedrol: An injection of Depomedrol 40 mg , 2 cc. Lidocaine: An injection of Lidocaine 1 mg , 13 cc.  Medication was injected in the right subacromial space and glenohumeral joint. Patient has tried OTC's including aspirin, Ibuprofen, Aleve etc or prescription NSAIDS, and/or exercises at home and/ or physical therapy without satisfactory response. The risks, benefits, and alternatives to steroid injection were explained in full to the patient. Risks outlined include but are not limited to infection, sepsis, bleeding, scarring, skin discoloration, temporary increase in pain, syncopal episode, failure to resolve symptoms, allergic reaction, symptom recurrence, and elevation of blood sugar in diabetics. Patient understood the risks. All questions were answered. After discussion, patient requested an injection. Oral informed consent was obtained.  Sterile preparation with betadine and aseptic technique was utilized for the procedure, including the preparation of the solutions used for the injection. Patient tolerated the procedure well.   Post Procedure Instructions: Patient was advised to call if redness, pain, or fever occur and apply ice for 15 min. out of every hour for the next 12-24 hours as tolerated. Patient was advised to rest the joint(s) for 3 days.  Advised to ice the injection site this evening. Ultrasound Guidance was used for the following reasons: for precise injection in area of tear. Visualization of the needle and placement of injection was performed without complication.

## 2024-06-03 NOTE — HISTORY OF PRESENT ILLNESS
[de-identified] : 68yo RHD M here for right shoulder pain that has worsened recently without injury. Last saw Dr. Pedroza in July 2019.  H/O Right shoulder surgery June 2015 - RCR (4 anchor repair for 2 cm tear) and September 2016 Revision RCR by Dr. MARGE Rogers (with soft tissue tenodesis, and 2 more swivelock anchors and fibertape, with DCR).

## 2024-07-17 ENCOUNTER — APPOINTMENT (OUTPATIENT)
Dept: ORTHOPEDIC SURGERY | Facility: CLINIC | Age: 69
End: 2024-07-17
Payer: MEDICARE

## 2024-07-17 VITALS — WEIGHT: 200 LBS | HEIGHT: 71 IN | BODY MASS INDEX: 28 KG/M2

## 2024-07-17 DIAGNOSIS — M75.111 INCOMPLETE ROTATOR CUFF TEAR OR RUPTURE OF RIGHT SHOULDER, NOT SPECIFIED AS TRAUMATIC: ICD-10-CM

## 2024-07-17 DIAGNOSIS — Z98.890 OTHER SPECIFIED POSTPROCEDURAL STATES: ICD-10-CM

## 2024-07-17 PROCEDURE — 99214 OFFICE O/P EST MOD 30 MIN: CPT

## 2024-07-19 ENCOUNTER — APPOINTMENT (OUTPATIENT)
Dept: MRI IMAGING | Facility: CLINIC | Age: 69
End: 2024-07-19

## 2024-07-19 ENCOUNTER — RESULT REVIEW (OUTPATIENT)
Age: 69
End: 2024-07-19

## 2024-07-19 ENCOUNTER — OUTPATIENT (OUTPATIENT)
Dept: OUTPATIENT SERVICES | Facility: HOSPITAL | Age: 69
LOS: 1 days | End: 2024-07-19
Payer: MEDICARE

## 2024-07-19 DIAGNOSIS — M75.111 INCOMPLETE ROTATOR CUFF TEAR OR RUPTURE OF RIGHT SHOULDER, NOT SPECIFIED AS TRAUMATIC: ICD-10-CM

## 2024-07-19 DIAGNOSIS — Z90.49 ACQUIRED ABSENCE OF OTHER SPECIFIED PARTS OF DIGESTIVE TRACT: Chronic | ICD-10-CM

## 2024-07-19 DIAGNOSIS — Z98.890 OTHER SPECIFIED POSTPROCEDURAL STATES: Chronic | ICD-10-CM

## 2024-07-19 DIAGNOSIS — Z87.39 PERSONAL HISTORY OF OTHER DISEASES OF THE MUSCULOSKELETAL SYSTEM AND CONNECTIVE TISSUE: Chronic | ICD-10-CM

## 2024-07-19 PROCEDURE — 73221 MRI JOINT UPR EXTREM W/O DYE: CPT | Mod: 26,RT,MH

## 2024-07-19 PROCEDURE — 73221 MRI JOINT UPR EXTREM W/O DYE: CPT

## 2024-07-31 ENCOUNTER — APPOINTMENT (OUTPATIENT)
Dept: ORTHOPEDIC SURGERY | Facility: CLINIC | Age: 69
End: 2024-07-31
Payer: MEDICARE

## 2024-07-31 VITALS — HEIGHT: 71 IN | WEIGHT: 200 LBS | BODY MASS INDEX: 28 KG/M2

## 2024-07-31 DIAGNOSIS — S46.119S STRAIN OF MUSCLE, FASCIA AND TENDON OF LONG HEAD OF BICEPS, UNSPECIFIED ARM, SEQUELA: ICD-10-CM

## 2024-07-31 DIAGNOSIS — M75.111 INCOMPLETE ROTATOR CUFF TEAR OR RUPTURE OF RIGHT SHOULDER, NOT SPECIFIED AS TRAUMATIC: ICD-10-CM

## 2024-07-31 DIAGNOSIS — Z98.890 OTHER SPECIFIED POSTPROCEDURAL STATES: ICD-10-CM

## 2024-07-31 PROCEDURE — 99214 OFFICE O/P EST MOD 30 MIN: CPT

## 2024-07-31 NOTE — HISTORY OF PRESENT ILLNESS
[5] : 5 [0] : 0 [Dull/Aching] : dull/aching [Frequent] : frequent [Sleep] : sleep [Rest] : rest [Physical therapy] : physical therapy [Lying in bed] : lying in bed [Retired] : Work status: retired [de-identified] : Patient is here for MRI results of his right shoulder. Pain feels worse. [FreeTextEntry1] : Right shoulder [de-identified] : Physical therapy 2 x a week

## 2024-07-31 NOTE — HISTORY OF PRESENT ILLNESS
[5] : 5 [0] : 0 [Dull/Aching] : dull/aching [Frequent] : frequent [Sleep] : sleep [Rest] : rest [Physical therapy] : physical therapy [Lying in bed] : lying in bed [Retired] : Work status: retired [de-identified] : Patient is here for MRI results of his right shoulder. Pain feels worse. [FreeTextEntry1] : Right shoulder [de-identified] : Physical therapy 2 x a week

## 2024-07-31 NOTE — DATA REVIEWED
[FreeTextEntry1] : MRI R SHOULDER 7/19/24 [facility]:  The at least 4 anchors are in position. The biceps groove shows a tenodesis. The subscapularis muscle is ok as is the subscapularis muscle. The supraspinatus muscle is acceptable. A DCR is likely. The repair is thin at best.  MRI R SHOULDER 2021 [facility] (report): There is cuff thinning and fraying with HH cartilage loss.  X-rays of the right shoulder is as follows: Shoulder Comments: The anterior metal anchor is unchanged. There is some GH narrowing, though the same as the 2019 films. There is AC spurring. Scapula Comments: There is a Type I-II acromion with a small lateral spur. The AHI seems ok.

## 2024-07-31 NOTE — DATA REVIEWED
[FreeTextEntry1] : MRI R SHOULDER 7/19/24 [facility]:  The at least 4 anchors are in position. The biceps groove shows a tenodesis. The subscapularis muscle is ok as is the subscapularis muscle. The supraspinatus muscle is acceptable. A DCR is likely. The repair is thin at best.  MRI R SHOULDER 2021 [facility] (report): There is cuff thinning and fraying with HH cartilage loss.  X-rays of the right shoulder is as follows: Shoulder Comments: The anterior metal anchor is unchanged. There is some GH narrowing, though the same as the 2019 films. There is AC spurring. Scapula Comments: There is a Type I-II acromion with a small lateral spur. The AHI seems ok.

## 2024-07-31 NOTE — CONSULT LETTER
[Dear  ___] : Dear  [unfilled], [Courtesy Letter:] : I had the pleasure of seeing your patient, [unfilled], in my office today. [FreeTextEntry1] : Please see my note below.  If you have any questions, please do not hesitate to contact me.  Sincerely,  Shar Pedroza M.D. Shoulder Surgery

## 2024-07-31 NOTE — REASON FOR VISIT
[FreeTextEntry2] : This is a 69 year old RHD retired M with right shoulder pain that has worsened since early 2024.  There was no recent injury.  He has had two prior surgeries.  The first was in 2015 and included a repair of a 2cm cuff tear (4 anchors), and a revision in 2016 by Dr. RAMAKRISHNA Rogers with a soft tissue biceps tenodesis, FiberTape and 2 SwivelLock anchors, and a DCR.  He did OK.  There was pain on and off and he last saw Dr. Pedroza in 2019.  He had had injections with Dr. Marino, and most recently saw Dr. Marcelino.  A shoulder replacement was 50/50.  He walks with a cane in his right hand for MS. The R SA/GH injection 6/3/24 and PT had helped. He is feeling slightly improved.  The R SH MRI was done.  His main complaint is persistent pain.

## 2024-07-31 NOTE — PHYSICAL EXAM
[Right] : right shoulder [Sitting] : sitting [Moderate] : moderate [Mild] : mild [4 ___] : forward flexion 4[unfilled]/5 [4___] : external rotation 4[unfilled]/5 [] : no sensory deficits [FreeTextEntry9] : L SHOULDER: 150/50 [TWNoteComboBox7] : active forward flexion 140 degrees [TWNoteComboBox4] : passive forward flexion 150 degrees [de-identified] : external rotation 45 degrees

## 2024-07-31 NOTE — PHYSICAL EXAM
[Right] : right shoulder [Sitting] : sitting [Moderate] : moderate [Mild] : mild [4 ___] : forward flexion 4[unfilled]/5 [4___] : external rotation 4[unfilled]/5 [] : no sensory deficits [FreeTextEntry9] : L SHOULDER: 150/50 [TWNoteComboBox7] : active forward flexion 140 degrees [TWNoteComboBox4] : passive forward flexion 150 degrees [de-identified] : external rotation 45 degrees

## 2024-07-31 NOTE — ASSESSMENT
[FreeTextEntry1] : . We reviewed the MRI findings.  We discussed treatment options, both non-operative and operative.  I do think he is a candidate for surgery.  Pain relief is a goal as well as improving function and motion.  I reviewed surgical techniques pictorially in the books that I co-edited.  Interscalene anesthesia, general anesthesia and postoperative pain management were discussed.  The importance of physical therapy postoperatively, the gradual recovery and the rehabilitation program with initial driving restrictions were noted.  The use of a Cryo-Cuff by [employer] and a sling for functional recovery was reviewed.  He understands there are no guarantees.  The benefits of decreased pain, increased function and restoring anatomy were outlined.  The risks were reviewed including, but not limited to, infection, failure, bleeding, stiffness, pain, clotting, fracture, re-tear, hardware failure, deformity, functional limitation, scarring, neurovascular compromise, and narcotic use issues.  Under certain circumstances we discussed, further surgery may be indicated.  He understands that 100% recovery is not expected, and the desired level of function may not be achievable.  The complicated nature of his condition, including the tear pattern, was noted.  We discussed the potential for a prolonged recovery course and the potential for this to affect his activities, which could include a work regimen.  His questions were answered.  Other opinions can be pursued, as we discussed.  He does wish to proceed with surgery.  This would include a right shoulder arthroscopy, debridement, loose body hardware removal, Inspace balloon insertion, possible cuff repair.  He will move his cane to the left UE initially as he recovers.  Medical clearance is planned.  We will schedule this at the earliest mutual convenient time.   Patient seen by Shar Pedroza M.D. Entered by Nadiya Gomez acting as scribe.

## 2024-08-07 ENCOUNTER — APPOINTMENT (OUTPATIENT)
Dept: ORTHOPEDIC SURGERY | Facility: CLINIC | Age: 69
End: 2024-08-07

## 2024-08-07 PROCEDURE — 99213 OFFICE O/P EST LOW 20 MIN: CPT

## 2024-08-08 ENCOUNTER — APPOINTMENT (OUTPATIENT)
Dept: ORTHOPEDIC SURGERY | Facility: CLINIC | Age: 69
End: 2024-08-08

## 2025-03-21 ENCOUNTER — OUTPATIENT (OUTPATIENT)
Dept: OUTPATIENT SERVICES | Facility: HOSPITAL | Age: 70
LOS: 1 days | End: 2025-03-21
Payer: MEDICARE

## 2025-03-21 ENCOUNTER — APPOINTMENT (OUTPATIENT)
Dept: MRI IMAGING | Facility: CLINIC | Age: 70
End: 2025-03-21
Payer: MEDICARE

## 2025-03-21 DIAGNOSIS — Z00.8 ENCOUNTER FOR OTHER GENERAL EXAMINATION: ICD-10-CM

## 2025-03-21 DIAGNOSIS — Z87.39 PERSONAL HISTORY OF OTHER DISEASES OF THE MUSCULOSKELETAL SYSTEM AND CONNECTIVE TISSUE: Chronic | ICD-10-CM

## 2025-03-21 DIAGNOSIS — Z98.890 OTHER SPECIFIED POSTPROCEDURAL STATES: Chronic | ICD-10-CM

## 2025-03-21 DIAGNOSIS — Z90.49 ACQUIRED ABSENCE OF OTHER SPECIFIED PARTS OF DIGESTIVE TRACT: Chronic | ICD-10-CM

## 2025-03-21 PROCEDURE — 72141 MRI NECK SPINE W/O DYE: CPT | Mod: 26

## 2025-03-21 PROCEDURE — 72141 MRI NECK SPINE W/O DYE: CPT

## 2025-08-06 ENCOUNTER — NON-APPOINTMENT (OUTPATIENT)
Age: 70
End: 2025-08-06

## 2025-08-08 ENCOUNTER — APPOINTMENT (OUTPATIENT)
Dept: VASCULAR SURGERY | Facility: CLINIC | Age: 70
End: 2025-08-08
Payer: MEDICARE

## 2025-08-08 VITALS
HEIGHT: 71 IN | SYSTOLIC BLOOD PRESSURE: 129 MMHG | WEIGHT: 204 LBS | DIASTOLIC BLOOD PRESSURE: 79 MMHG | BODY MASS INDEX: 28.56 KG/M2 | TEMPERATURE: 98.2 F | HEART RATE: 71 BPM

## 2025-08-08 VITALS — DIASTOLIC BLOOD PRESSURE: 75 MMHG | HEART RATE: 73 BPM | SYSTOLIC BLOOD PRESSURE: 130 MMHG

## 2025-08-08 PROCEDURE — 99204 OFFICE O/P NEW MOD 45 MIN: CPT

## 2025-08-08 PROCEDURE — 93970 EXTREMITY STUDY: CPT

## 2025-08-08 RX ORDER — PANTOPRAZOLE SODIUM 20 MG/1
TABLET, DELAYED RELEASE ORAL
Refills: 0 | Status: ACTIVE | COMMUNITY

## 2025-08-08 RX ORDER — COLCHICINE 0.6 MG/1
TABLET ORAL
Refills: 0 | Status: ACTIVE | COMMUNITY

## 2025-08-08 RX ORDER — ROSUVASTATIN CALCIUM 5 MG/1
TABLET, FILM COATED ORAL
Refills: 0 | Status: ACTIVE | COMMUNITY

## 2025-09-16 ENCOUNTER — APPOINTMENT (OUTPATIENT)
Dept: VASCULAR SURGERY | Facility: CLINIC | Age: 70
End: 2025-09-16